# Patient Record
Sex: FEMALE | Race: WHITE | HISPANIC OR LATINO | URBAN - METROPOLITAN AREA
[De-identification: names, ages, dates, MRNs, and addresses within clinical notes are randomized per-mention and may not be internally consistent; named-entity substitution may affect disease eponyms.]

---

## 2023-04-14 PROBLEM — R22.31 NODULE OF FINGER OF RIGHT HAND: Status: ACTIVE | Noted: 2023-04-14

## 2023-05-04 ENCOUNTER — TELEPHONE (OUTPATIENT)
Dept: FAMILY MEDICINE CLINIC | Facility: CLINIC | Age: 38
End: 2023-05-04

## 2023-12-26 ENCOUNTER — TELEPHONE (OUTPATIENT)
Dept: FAMILY MEDICINE CLINIC | Facility: CLINIC | Age: 38
End: 2023-12-26

## 2023-12-27 ENCOUNTER — TELEPHONE (OUTPATIENT)
Dept: FAMILY MEDICINE CLINIC | Facility: CLINIC | Age: 38
End: 2023-12-27

## 2024-01-02 ENCOUNTER — TELEPHONE (OUTPATIENT)
Dept: OTHER | Facility: OTHER | Age: 39
End: 2024-01-02

## 2024-01-02 NOTE — TELEPHONE ENCOUNTER
Pt's spouse called and states that she has Covid and is coughing feel bad and has a fever. She tested positive yesterday. He would like to know if he could have some medicine to give her. Please call.

## 2024-01-03 ENCOUNTER — TELEPHONE (OUTPATIENT)
Dept: FAMILY MEDICINE CLINIC | Facility: CLINIC | Age: 39
End: 2024-01-03

## 2024-03-20 ENCOUNTER — OFFICE VISIT (OUTPATIENT)
Dept: FAMILY MEDICINE CLINIC | Facility: CLINIC | Age: 39
End: 2024-03-20

## 2024-03-20 VITALS
RESPIRATION RATE: 24 BRPM | WEIGHT: 185 LBS | OXYGEN SATURATION: 98 % | BODY MASS INDEX: 31.58 KG/M2 | DIASTOLIC BLOOD PRESSURE: 85 MMHG | TEMPERATURE: 98.5 F | SYSTOLIC BLOOD PRESSURE: 125 MMHG | HEIGHT: 64 IN | HEART RATE: 106 BPM

## 2024-03-20 DIAGNOSIS — J06.9 VIRAL UPPER RESPIRATORY TRACT INFECTION: Primary | ICD-10-CM

## 2024-03-20 DIAGNOSIS — R22.31 NODULE OF FINGER OF RIGHT HAND: ICD-10-CM

## 2024-03-20 LAB
SARS-COV-2 AG UPPER RESP QL IA: NEGATIVE
VALID CONTROL: NORMAL

## 2024-03-20 PROCEDURE — 99213 OFFICE O/P EST LOW 20 MIN: CPT | Performed by: FAMILY MEDICINE

## 2024-03-20 PROCEDURE — 87811 SARS-COV-2 COVID19 W/OPTIC: CPT | Performed by: FAMILY MEDICINE

## 2024-03-20 RX ORDER — FLUTICASONE PROPIONATE 50 MCG
2 SPRAY, SUSPENSION (ML) NASAL DAILY
Qty: 9.9 ML | Refills: 2 | Status: SHIPPED | OUTPATIENT
Start: 2024-03-20

## 2024-03-20 NOTE — PROGRESS NOTES
Name: Elsa Liz      : 1985      MRN: [id number]  Encounter Provider: Aura Estrada MD  Encounter Date: 3/20/2024   Encounter department: [facility]    Assessment & Plan     1. Viral upper respiratory tract infection  Assessment & Plan:  URI symptoms for 4 days  Generalized weakness and body aches  Likely viral URI  Recommend starting flonase  Adviced other OTC measures such as cold medicine, cough syrup, saline nasal spray for sinus rinse and other OTC allergy medication such as allegra, zyrtec, etc.   Advised follow in 2 weeks if persistent or worsening symptoms.      Orders:  -     POCT Rapid Covid Ag  -     fluticasone (FLONASE) 50 mcg/act nasal spray; 2 sprays into each nostril daily    2. Nodule of finger of right hand  Assessment & Plan:  Noted since last year  Xray of the hand was reviewed and normal.   DD include ganglion cyst vs other types of skin nodule.   Refer to hand surgery for aspiration vs excision.   However would wait till her current URI symptoms resolved before any procedure.       Orders:  -     Ambulatory Referral to Orthopedic Surgery; Future         Results for orders placed or performed in visit on 24   POCT Rapid Covid Ag   Result Value Ref Range    POCT SARS-CoV-2 Ag Negative Negative    VALID CONTROL Valid        Subjective      HPI    She is here for the new evaluation of generalized weakness, diffuse body aches, nasal congestion, productive cough, headaches, and nausea. Reported taking 4 tylenols a day with improvement somewhat. Stated one time RLQ pain this morning and resolved on its own. No diarrhea or constipation. Denied fever or chills.     She also complained persistent skin nodule over right little finger over the DIP. Stated pain occasionally.       Review of Systems   Constitutional:  Positive for fatigue. Negative for chills and fever.   HENT:  Positive for congestion, ear pain, rhinorrhea and sinus pain.  "Negative for sore throat.    Eyes:  Negative for pain and visual disturbance.   Respiratory:  Negative for cough and shortness of breath.    Cardiovascular:  Negative for chest pain and palpitations.   Gastrointestinal:  Positive for nausea. Negative for abdominal pain, constipation, diarrhea and vomiting.   Genitourinary:  Negative for dysuria and hematuria.   Musculoskeletal:  Negative for arthralgias and back pain.   Skin:  Negative for color change and rash.   Neurological:  Negative for seizures and syncope.   All other systems reviewed and are negative.      No current outpatient medications on file prior to visit.       Objective     /85   Pulse (!) 106   Temp 98.5 °F (36.9 °C) (Temporal)   Resp (!) 24   Ht 5' 4\" (1.626 m)   Wt 83.9 kg (185 lb)   SpO2 98%   BMI 31.76 kg/m²     Physical Exam  Vitals and nursing note reviewed.   Constitutional:       Appearance: Normal appearance.   HENT:      Head: Normocephalic and atraumatic.   Eyes:      Conjunctiva/sclera: Conjunctivae normal.      Pupils: Pupils are equal, round, and reactive to light.   Cardiovascular:      Rate and Rhythm: Normal rate and regular rhythm.      Pulses: Normal pulses.      Heart sounds: Normal heart sounds. No murmur heard.     No friction rub.   Pulmonary:      Effort: Pulmonary effort is normal. No respiratory distress.      Breath sounds: Normal breath sounds. No stridor. No wheezing, rhonchi or rales.   Chest:      Chest wall: No tenderness.   Abdominal:      General: Abdomen is flat. Bowel sounds are normal. There is no distension.      Palpations: Abdomen is soft. There is no mass.      Tenderness: There is no abdominal tenderness. There is no guarding or rebound.      Hernia: No hernia is present.   Musculoskeletal:         General: No swelling or tenderness.      Cervical back: Normal range of motion.      Right lower leg: No edema.      Left lower leg: No edema.   Skin:     General: Skin is warm and dry.      " Findings: No erythema.   Neurological:      Mental Status: She is alert and oriented to person, place, and time.   Psychiatric:         Behavior: Behavior normal.       Aura Estrada MD

## 2024-03-20 NOTE — ASSESSMENT & PLAN NOTE
URI symptoms for 4 days  Generalized weakness and body aches  COVID negative in the office.   Likely viral URI  Recommend starting flonase  Adviced other OTC measures such as cold medicine, cough syrup, saline nasal spray for sinus rinse and other OTC allergy medication such as allegra, zyrtec, etc.   Advised follow in 2 weeks if persistent or worsening symptoms.

## 2024-03-20 NOTE — ASSESSMENT & PLAN NOTE
Noted since last year  Xray of the hand was reviewed and normal.   DD include ganglion cyst vs other types of skin nodule.   Refer to hand surgery for aspiration vs excision.   However would wait till her current URI symptoms resolved before any procedure.

## 2024-03-23 ENCOUNTER — NURSE TRIAGE (OUTPATIENT)
Dept: OTHER | Facility: OTHER | Age: 39
End: 2024-03-23

## 2024-03-23 NOTE — TELEPHONE ENCOUNTER
"Reason for Disposition  • Fever present > 3 days (72 hours)  • SEVERE coughing spells (e.g., whooping sound after coughing, vomiting after coughing)    Answer Assessment - Initial Assessment Questions  1. ONSET: \"When did the cough begin?\"       Since 3/17    2. SEVERITY: \"How bad is the cough today?\"       Continuous cough    3. SPUTUM: \"Describe the color of your sputum\" (none, dry cough; clear, white, yellow, green)      Nasal congestion- green mucus      4. HEMOPTYSIS: \"Are you coughing up any blood?\" If so ask: \"How much?\" (flecks, streaks, tablespoons, etc.)      Denies    5. DIFFICULTY BREATHING: \"Are you having difficulty breathing?\" If Yes, ask: \"How bad is it?\" (e.g., mild, moderate, severe)     - MILD: No SOB at rest, mild SOB with walking, speaks normally in sentences, can lay down, no retractions, pulse < 100.     - MODERATE: SOB at rest, SOB with minimal exertion and prefers to sit, cannot lie down flat, speaks in phrases, mild retractions, audible wheezing, pulse 100-120.     - SEVERE: Very SOB at rest, speaks in single words, struggling to breathe, sitting hunched forward, retractions, pulse > 120       Denies SOB. Nose is very congested. No chest pain- just some pain when coughing.     6. FEVER: \"Do you have a fever?\" If Yes, ask: \"What is your temperature, how was it measured, and when did it start?\"      103.6 (oral). Started on Thursday.    7. CARDIAC HISTORY: \"Do you have any history of heart disease?\" (e.g., heart attack, congestive heart failure)       Denies    8. LUNG HISTORY: \"Do you have any history of lung disease?\"  (e.g., pulmonary embolus, asthma, emphysema)      Denies    9. PE RISK FACTORS: \"Do you have a history of blood clots?\" (or: recent major surgery, recent prolonged travel, bedridden)      Denies    10. OTHER SYMPTOMS: \"Do you have any other symptoms?\" (e.g., runny nose, wheezing, chest pain)        Throat is very sore since Thursday.    11. PREGNANCY: \"Is there any chance you " "are pregnant?\" \"When was your last menstrual period?\"        Denies    Protocols used: Cough - Acute Non-Productive-ADULT-AH    "

## 2024-03-23 NOTE — TELEPHONE ENCOUNTER
"Regardin.6 fever, sore throat / needs   ----- Message from Brianna Daniel sent at 3/23/2024  6:02 PM EDT -----  \" I was seen on Wednesday, I never got a  and the provider did not explain exactly what was going on. I feel very upset and like she overlooked my symptoms because now I still have a 103.6 fever and feel horrible can someone please help me with something. Also, please note I do not want to be seen by that doctor again. \"    "